# Patient Record
(demographics unavailable — no encounter records)

---

## 2017-10-18 NOTE — CT
CT ABDOMEN AND PELVIS WITH AND WITHOUT CONTRAST:

 

Technique: Multiple axial tomograms were obtained through the abdomen and pelvis pre and post IV con
trast. Post contrast images obtained in the portal venous and delayed venous phase following urograp
hic protocol. 

 

History: Gross hematuria. 

 

FINDINGS: 

Review of the urinary tract on the noncontrast imaging shows no evidence of urinary tract calculus. 
There is no evidence of hydronephrosis. The ureters are normal caliber. 

 

The bladder shows a thickened wall. There is soft tissue mass arising from the floor of the bladder 
measuring up to 4 cm diameter. There are two bladder calculi seen. One measures 7-8 mm and the other
 measures 5-6 mm diameter. The prostate is mildly enlarged. 

 

On the post contrast images the kidneys show symmetric enhancement. There are tiny cystic lesions in
 both kidneys subcentimeter and too small to adequately characterize. No definite enhancing mass. On
 the delayed sequence there is contrast secretion into the collecting structures. Collecting structu
res appear unremarkable. 

 

Lung bases are clear. 

 

The liver, spleen, pancreas, and adrenal glands appear unremarkable. Bowel loops unremarkable. Appen
jung is normal. Aorta is normal caliber. No adenopathy identified. Osseous structures are unremarkabl
e. 

 

IMPRESSION: 

1. There is abnormal bladder wall thickening. There are two bladder calculi seen, one measuring appr
oximately 7-8 mm and the other measuring 5 mm. There is a soft tissue mass arising from the floor of
 the bladder in the region of the bladder neck measuring up to 4 cm diameter worrisome for a bladder
 neoplasm. 

2. Mild prostatic hypertrophy.

 

 

 

POS: [facility]